# Patient Record
Sex: MALE | Race: WHITE | Employment: FULL TIME | URBAN - METROPOLITAN AREA
[De-identification: names, ages, dates, MRNs, and addresses within clinical notes are randomized per-mention and may not be internally consistent; named-entity substitution may affect disease eponyms.]

---

## 2017-06-07 ENCOUNTER — OFFICE VISIT (OUTPATIENT)
Dept: INTERNAL MEDICINE CLINIC | Facility: CLINIC | Age: 52
End: 2017-06-07

## 2017-06-07 VITALS
DIASTOLIC BLOOD PRESSURE: 70 MMHG | TEMPERATURE: 98 F | BODY MASS INDEX: 29 KG/M2 | WEIGHT: 198 LBS | HEART RATE: 60 BPM | RESPIRATION RATE: 17 BRPM | SYSTOLIC BLOOD PRESSURE: 112 MMHG

## 2017-06-07 DIAGNOSIS — R20.2 LEFT HAND PARESTHESIA: ICD-10-CM

## 2017-06-07 DIAGNOSIS — R09.82 POST-NASAL DRAINAGE: ICD-10-CM

## 2017-06-07 DIAGNOSIS — M25.522 LEFT ELBOW PAIN: Primary | ICD-10-CM

## 2017-06-07 DIAGNOSIS — Z91.09 ENVIRONMENTAL ALLERGIES: ICD-10-CM

## 2017-06-07 PROCEDURE — 99213 OFFICE O/P EST LOW 20 MIN: CPT | Performed by: PHYSICIAN ASSISTANT

## 2017-06-07 RX ORDER — MONTELUKAST SODIUM 10 MG/1
10 TABLET ORAL DAILY
Qty: 30 TABLET | Refills: 11 | Status: SHIPPED | OUTPATIENT
Start: 2017-06-07 | End: 2017-12-21

## 2017-06-07 RX ORDER — FEXOFENADINE HCL AND PSEUDOEPHEDRINE HCI 180; 240 MG/1; MG/1
1 TABLET, EXTENDED RELEASE ORAL DAILY
Qty: 30 TABLET | Refills: 1 | Status: SHIPPED | OUTPATIENT
Start: 2017-06-07 | End: 2018-06-11 | Stop reason: ALTCHOICE

## 2017-06-07 NOTE — PATIENT INSTRUCTIONS
Consider changing Allegra D to  Xyzal (over the counter). Add Singulair (Montelukast) now for post-nasal drainage.

## 2017-06-07 NOTE — PROGRESS NOTES
Patient presents with: Allergies: seasonal, rx for allegra for longer period than 10 day   Elbow Pain: left, just got tender to touch, fingers get numb. .. whenever carrying anything can feel the pressure - does not recall hiting it at all       HPI:  Pt p PERRLA. Neck: Normal range of motion. Neck supple. Cardiovascular: Normal rate, regular rhythm. No murmur, rubs or gallops. Pulmonary/Chest: Effort normal and breath sounds normal. No respiratory distress.  No wheezes, rhonchi or rales  MS:  Full arom

## 2017-12-21 PROBLEM — J30.9 ALLERGIC RHINITIS, UNSPECIFIED CHRONICITY, UNSPECIFIED SEASONALITY, UNSPECIFIED TRIGGER: Status: ACTIVE | Noted: 2017-12-21

## 2018-01-24 PROBLEM — J30.9 ALLERGIC RHINITIS, UNSPECIFIED SEASONALITY, UNSPECIFIED TRIGGER: Status: ACTIVE | Noted: 2017-12-21

## 2018-06-11 ENCOUNTER — OFFICE VISIT (OUTPATIENT)
Dept: INTERNAL MEDICINE CLINIC | Facility: CLINIC | Age: 53
End: 2018-06-11

## 2018-06-11 VITALS
BODY MASS INDEX: 28.06 KG/M2 | HEART RATE: 52 BPM | DIASTOLIC BLOOD PRESSURE: 70 MMHG | SYSTOLIC BLOOD PRESSURE: 114 MMHG | TEMPERATURE: 98 F | RESPIRATION RATE: 16 BRPM | HEIGHT: 70 IN | WEIGHT: 196 LBS

## 2018-06-11 DIAGNOSIS — L98.9 SKIN LESION: Primary | ICD-10-CM

## 2018-06-11 DIAGNOSIS — R20.9 SKIN SENSATION DISTURBANCE: ICD-10-CM

## 2018-06-11 PROCEDURE — 99213 OFFICE O/P EST LOW 20 MIN: CPT | Performed by: NURSE PRACTITIONER

## 2018-06-11 RX ORDER — VALACYCLOVIR HYDROCHLORIDE 1 G/1
1 TABLET, FILM COATED ORAL 3 TIMES DAILY
Qty: 21 TABLET | Refills: 0 | Status: SHIPPED | OUTPATIENT
Start: 2018-06-11 | End: 2018-06-18

## 2018-06-11 NOTE — PROGRESS NOTES
Patient presents with:  Bump: pt has a painful bump on L middle back radiating towards the L rib cage area x 1 week.  LB-room 7      HPI:  Presents with approx 1 week history of painful \"bump\" to left mid back region with pain radiating toward left rib ca and dry. No rash noted. No pallor. Noted with solitary erythematous, elevated lesion to back approx 4mm in size. Noted with some surrounding induration. Lesion w/o drainage or exudate. No other erythema, lesions, rash or vesicles noted.        A/P:    Skin

## 2020-02-18 ENCOUNTER — TELEPHONE (OUTPATIENT)
Dept: INTERNAL MEDICINE CLINIC | Facility: CLINIC | Age: 55
End: 2020-02-18

## 2020-02-18 NOTE — TELEPHONE ENCOUNTER
Pt last seen 2018. Due for CPE with [facility] to address. Is JL still PCP? Did [place]? If so then JL needs to be removed as PCP and any provider in our office from Care Team.  If Kwaku Lawler still PCP then schedule CPE soon.

## (undated) NOTE — MR AVS SNAPSHOT
[facility] [address] [address]  [phone]               Thank you for choosing us for your health care visit with Shaq Marley PA-C.   We are glad to serve you and happy to provide you with this castro Your physician has referred you to a specialist.  Your physician or the clinic staff will provide you with the phone number you should call to schedule your appointment.      If you are confident that your benefit plan will not require a referral or authori Instructions and Information about Your Health    Consider changing Allegra D to  Xyzal (over the counter). Add Singulair (Montelukast) now for post-nasal drainage.        Allergies as of Jun 07, 2017     Dust Anaphylaxis    Mold     Peanuts     Not s Healthy Diet and Regular Exercise  [employer] [facility] [employer] for making healthy food choices  -   Enjoy your food, but eat less. Fully enjoy your food when eating. Don’t eat while distracted and slow down. Avoid over sized portions.    Roselyn San